# Patient Record
Sex: MALE | Race: WHITE | Employment: FULL TIME | URBAN - METROPOLITAN AREA
[De-identification: names, ages, dates, MRNs, and addresses within clinical notes are randomized per-mention and may not be internally consistent; named-entity substitution may affect disease eponyms.]

---

## 2019-08-22 ENCOUNTER — OFFICE VISIT (OUTPATIENT)
Dept: FAMILY MEDICINE | Facility: CLINIC | Age: 34
End: 2019-08-22
Payer: COMMERCIAL

## 2019-08-22 VITALS
HEIGHT: 72 IN | HEART RATE: 77 BPM | SYSTOLIC BLOOD PRESSURE: 114 MMHG | RESPIRATION RATE: 14 BRPM | BODY MASS INDEX: 28.12 KG/M2 | OXYGEN SATURATION: 98 % | DIASTOLIC BLOOD PRESSURE: 72 MMHG | WEIGHT: 207.6 LBS

## 2019-08-22 DIAGNOSIS — K21.00 GASTROESOPHAGEAL REFLUX DISEASE WITH ESOPHAGITIS: ICD-10-CM

## 2019-08-22 DIAGNOSIS — Z30.9 ENCOUNTER FOR CONTRACEPTIVE MANAGEMENT, UNSPECIFIED TYPE: ICD-10-CM

## 2019-08-22 DIAGNOSIS — Z00.00 ROUTINE GENERAL MEDICAL EXAMINATION AT A HEALTH CARE FACILITY: Primary | ICD-10-CM

## 2019-08-22 PROCEDURE — 99385 PREV VISIT NEW AGE 18-39: CPT | Performed by: FAMILY MEDICINE

## 2019-08-22 PROCEDURE — 99213 OFFICE O/P EST LOW 20 MIN: CPT | Mod: 25 | Performed by: FAMILY MEDICINE

## 2019-08-22 ASSESSMENT — MIFFLIN-ST. JEOR: SCORE: 1921.73

## 2019-08-22 ASSESSMENT — PAIN SCALES - GENERAL: PAINLEVEL: NO PAIN (0)

## 2019-08-22 NOTE — PROGRESS NOTES
"Subjective     Chad Blum is a 34 year old male who presents to clinic today for the following health issues:    HPI   Eye problem: x 1.5 weeks. Left upper eye lid. Possible sty. Patient states there is a bump and it has been red and swollen since August 11th. Patient states it is itchy and she wakes up with a goupy eye. Patient states sometimes the vision in her left eye is blurry. Patient denies any pain but says it is irritating. The inside of patient eye looks a little irritated as red.      {additonal problems for provider to add (Optional):386753}    {HIST REVIEW/ LINKS 2 (Optional):897045}    {Additional problems for the provider to add (optional):667572}  Reviewed and updated as needed this visit by Provider         Review of Systems   {ROS COMP (Optional):546835}      Objective    There were no vitals taken for this visit.  There is no height or weight on file to calculate BMI.  Physical Exam   {Exam List (Optional):252131}    {Diagnostic Test Results (Optional):712426::\"Diagnostic Test Results:\",\"Labs reviewed in Epic\"}        {PROVIDER CHARTING PREFERENCE:336481}      "

## 2019-08-22 NOTE — PROGRESS NOTES
SUBJECTIVE:   CC: Chad Blum is an 34 year old male who presents for preventive health visit.     Chief Complaint   Patient presents with     Physical     discuss need for cholesterol      Consult     discuss vasectomy     Healthy Habits:    Do you get at least three servings of calcium containing foods daily (dairy, green leafy vegetables, etc.)? yes    Amount of exercise or daily activities, outside of work: 0 day(s) per week, active lifestyle and  for work.    Problems taking medications regularly No-not currently on any medications     Medication side effects: No    Have you had an eye exam in the past two years? no    Do you see a dentist twice per year? yes    Do you have sleep apnea, excessive snoring or daytime drowsiness? no    Today's PHQ-2 Score:   PHQ-2 ( 1999 Pf[employer]) 8/22/2019   Q1: Little interest or pleasure in doing things 0   Q2: Feeling down, depressed or hopeless 0   PHQ-2 Score 0     Abuse: Current or Past(Physical, Sexual or Emotional)- No  Do you feel safe in your environment? Yes    Social History     Tobacco Use     Smoking status: Former Smoker     Smokeless tobacco: Current User     Types: Chew     Tobacco comment: occasional chew   Substance Use Topics     Alcohol use: Yes     If you drink alcohol do you typically have >3 drinks per day or >7 drinks per week? No                      Last PSA: No results found for: PSA    Reviewed orders with patient. Reviewed health maintenance and updated orders accordingly - Yes  Reviewed and updated as needed this visit by clinical staff  Reviewed and updated as needed this visit by Provider      ROS:  CONSTITUTIONAL: NEGATIVE for fever, chills, change in weight  INTEGUMENTARY/SKIN: NEGATIVE for worrisome rashes, moles or lesions  EYES: NEGATIVE for vision changes or irritation  ENT: NEGATIVE for ear, mouth and throat problems  RESP: NEGATIVE for significant cough or SOB  CV: NEGATIVE for chest pain, palpitations or peripheral  edema  GI: Hx GERD   male: negative for dysuria, hematuria, decreased urinary stream, erectile dysfunction, urethral discharge  MUSCULOSKELETAL: NEGATIVE for significant arthralgias or myalgia  NEURO: NEGATIVE for weakness, dizziness or paresthesias  PSYCHIATRIC: NEGATIVE for changes in mood or affect    SUBJECTIVE:  This 34 year old male is in for a vasectomy consultation.  He and his partner have decided they don't want to have any more children. They have decided that he will have the sterilization procedure instead of her.  Patient was given information to read prior to the consultation.      He is  and  His wife agrees. He has three healthy children at age 5, 2.5 and 4 months. No allergies to Lidocaine or iodine. No bleeding disorders. No surgery on the groin.     We talked about the risks and benefits of the vasectomy; risks being that of potential for bleeding, infection, postoperative discomfort immediately which can last for a number of weeks afterwards, also the development of spermatoceles or sperm granulomas, epididymal cysts and the possibility of failure of the procedure producing failed attempt at sterility.     Also mentioned to the patient that there is some literature out there that suggests men who have vasectomies are at increased risk for prostate cancer; however, this literature is inconclusive and controversial.  It is recommended that men who have vasectomies should have annual prostate exams through the rectum yearly after age 40 and also may benefit from a screening prostatic specific antigen test after age 40.  We also discussed signs and symptoms of prostatic enlargement or prostatic problems.     I went through the procedure with the patient, how it is done, a midline incision in the scrotum measuring approximately 1/2 inch in length.  The vas deferens is brought up through this incision, dissected free and a small portion, maybe 0.5 cm. in length is removed.  Each end is tied  "with an absorbable suture, cauterized and then the proximal or distal end is buried away from the other.  Patient had no questions when it came to the procedure itself.  I did show him pictures and diagrams of the procedure.  I showed him where a potential spermatocele or sperm granuloma can occur.      Again, the patient had no further questions for me.      OBJECTIVE:   /72   Pulse 77   Resp 14   Ht 1.816 m (5' 11.5\")   Wt 94.2 kg (207 lb 9.6 oz)   SpO2 98%   BMI 28.55 kg/m    EXAM:  Exam:  GENERAL APPEARANCE: healthy, alert and no distress  EYES: EOMI,  PERRL  HENT: ear canals and TM's normal and nose and mouth without ulcers or lesions  NECK: no adenopathy, no asymmetry, masses, or scars and thyroid normal to palpation  RESP: lungs clear to auscultation - no rales, rhonchi or wheezes  CV: regular rates and rhythm, normal S1 S2, no S3 or S4 and no murmur, click or rub -  ABDOMEN:  soft, nontender, no HSM or masses and bowel sounds normal  GU_male: testicles normal without atrophy or masses, no hernias and penis normal without urethral discharge  MS: extremities normal- no gross deformities noted, no evidence of inflammation in joints, FROM in all extremities.  SKIN: no suspicious lesions or rashes  NEURO: Normal strength and tone, sensory exam grossly normal, mentation intact and speech normal  PSYCH: mentation appears normal and affect normal/bright  LYMPHATICS: No axillary, cervical, inguinal, or supraclavicular nodes      ASSESSMENT/PLAN:   1. Routine general medical examination at a health care facility  COUNSELING:  Reviewed preventive health counseling, as reflected in patient instructions       Regular exercise       Healthy diet/nutrition       Vision screening       Hearing screening    Estimated body mass index is 28.55 kg/m  as calculated from the following:    Height as of this encounter: 1.816 m (5' 11.5\").    Weight as of this encounter: 94.2 kg (207 lb 9.6 oz).   reports that he has " quit smoking. His smokeless tobacco use includes chew.  Tobacco Cessation Action Plan: Self help information given to patient    Counseling Resources:  ATP IV Guidelines  Pooled Cohorts Equation Calculator  FRAX Risk Assessment  ICSI Preventive Guidelines  Dietary Guidelines for Americans, 2010  USDA's MyPlate  ASA Prophylaxis  Lung CA Screening      2. Encounter for contraceptive management, unspecified type  ASSESSMENT:  Undesired fertility in an adult male, requesting vasectomy.    PLAN:  He will schedule a vasectomy at his convenience for either the last appointment of the morning or on a Thursday or Friday afternoon.  He is aware that he will need to take the entire weekend off and have essentially bedrest for two days with ice packs every two hours and ibuprofen for discomfort. If he has any further questions, he will contact me prior to the procedure or ask me on the day of the procedure.  He also is aware that he will need to bring a specimen after 10-12 ejaculations to make sure that he has cleared the storage vesicles of any residual sperm and to make sure that he is sterile.   3. Gastroesophageal reflux disease with esophagitis  For the reflux, or GERD, or heartburn, use the dietary instructions and the mechanical recommendations. Avoid alcohol by itself, spicy food, carbonated beverages and caffeine.   The goal for the symptoms is to be zero. Use the OTC acid stopping meds, such as Prilosec 20 mg daily. Recheck as needed.           Hernandez Contreras MD  [facility] [place]

## 2019-08-22 NOTE — PATIENT INSTRUCTIONS
"    Preventive Health Recommendations  Male Ages 26 - 39    Yearly exam:             See your health care provider every year in order to  o   Review health changes.   o   Discuss preventive care.    o   Review your medicines if your doctor has prescribed any.    You should be tested each year for STDs (sexually transmitted diseases), if you re at risk.     After age 35, talk to your provider about cholesterol testing. If you are at risk for heart disease, have your cholesterol tested at least every 5 years.     If you are at risk for diabetes, you should have a diabetes test (fasting glucose).  Shots: Get a flu shot each year. Get a tetanus shot every 10 years.     Nutrition:    Eat at least 5 servings of fruits and vegetables daily.     Eat whole-grain bread, whole-wheat pasta and brown rice instead of white grains and rice.     Get adequate Calcium and Vitamin D.     Lifestyle    Exercise for at least 150 minutes a week (30 minutes a day, 5 days a week). This will help you control your weight and prevent disease.     Limit alcohol to one drink per day.     No smoking.     Wear sunscreen to prevent skin cancer.     See your dentist every six months for an exam and cleaning.       ASSESSMENT/PLAN:   1. Routine general medical examination at a health care facility  COUNSELING:  Reviewed preventive health counseling, as reflected in patient instructions       Regular exercise       Healthy diet/nutrition       Vision screening       Hearing screening    Estimated body mass index is 28.55 kg/m  as calculated from the following:    Height as of this encounter: 1.816 m (5' 11.5\").    Weight as of this encounter: 94.2 kg (207 lb 9.6 oz).   reports that he has quit smoking. His smokeless tobacco use includes chew.  Tobacco Cessation Action Plan: Self help information given to patient    Counseling Resources:  ATP IV Guidelines  Pooled Cohorts Equation Calculator  FRAX Risk Assessment  ICSI Preventive Guidelines  Dietary " Guidelines for Americans, 2010  USDA's MyPlate  ASA Prophylaxis  Lung CA Screening      2. Encounter for contraceptive management, unspecified type  ASSESSMENT:  Undesired fertility in an adult male, requesting vasectomy.    PLAN:  He will schedule a vasectomy at his convenience for either the last appointment of the morning or on a Thursday or Friday afternoon.  He is aware that he will need to take the entire weekend off and have essentially bedrest for two days with ice packs every two hours and ibuprofen for discomfort. If he has any further questions, he will contact me prior to the procedure or ask me on the day of the procedure.  He also is aware that he will need to bring a specimen after 10-12 ejaculations to make sure that he has cleared the storage vesicles of any residual sperm and to make sure that he is sterile.   3. Gastroesophageal reflux disease with esophagitis  For the reflux, or GERD, or heartburn, use the dietary instructions and the mechanical recommendations. Avoid alcohol by itself, spicy food, carbonated beverages and caffeine.   The goal for the symptoms is to be zero. Use the OTC acid stopping meds, such as Prilosec 20 mg daily. Recheck as needed.

## 2019-09-19 ENCOUNTER — OFFICE VISIT (OUTPATIENT)
Dept: FAMILY MEDICINE | Facility: CLINIC | Age: 34
End: 2019-09-19
Payer: COMMERCIAL

## 2019-09-19 VITALS
BODY MASS INDEX: 29.21 KG/M2 | WEIGHT: 212.4 LBS | TEMPERATURE: 97.4 F | DIASTOLIC BLOOD PRESSURE: 66 MMHG | HEART RATE: 88 BPM | SYSTOLIC BLOOD PRESSURE: 110 MMHG | RESPIRATION RATE: 16 BRPM | OXYGEN SATURATION: 97 %

## 2019-09-19 DIAGNOSIS — Z30.2 ENCOUNTER FOR STERILIZATION: Primary | ICD-10-CM

## 2019-09-19 PROCEDURE — 55250 REMOVAL OF SPERM DUCT(S): CPT | Performed by: FAMILY MEDICINE

## 2019-09-19 PROCEDURE — 88302 TISSUE EXAM BY PATHOLOGIST: CPT | Performed by: FAMILY MEDICINE

## 2019-09-19 NOTE — PROGRESS NOTES
Subjective     Chad Blum is a 34 year old male who presents to clinic today for the following health issues:    HPI   Chief Complaint   Patient presents with     Vasectomy     SUBJECTIVE:  Informed consent was obtained.  An opportunity for questions was given, these were answered to his satisfaction.  Vasectomy literature was reviewed at his consult and post vas instruction sheets have also been reviewed.  He knows that it is meant to be a permanent procedure and that he needs to bring in a post vas specimen in eight to twelve weeks after twelve to fifteen ejaculations and have a negative semen analysis with no sperm seen before he can be considered sterile.  He also knows that he has to bring in a sample in one year. He wishes to proceed.  He did shave the night before.     SEDATION:  none    PROCEDURE:  The penis was taped up on to the abdomen.  The shave was adequate. The scrotum was prepped with Betadine and draped in a sterile fashion.  Attention was turned to the right vas; this was palpated and the area infiltrated with 1% Lidocaine plain.  A 1 cm. incision was made in the skin and blunt dissection carried out through the subcutaneous tissue.  The vas was grasped with the vas clamp and brought to the surface.  The wayne vas tissue was then dissected free.  The vas was then doubly clamped and a 3 mm. section excised.  This was sent to Pathology for confirmation.  The ends were then cauterized and the proximal end buried with a pursestring suture using 4-0 Vicryl.  Any bleeders were controlled with the pursestring suture and/or cautery. The ends were inspected and hemostasis was deemed to be adequate.  The two ends were then delivered back into the scrotum and the wound was dressed.  Attention was then turned to the left vas and a similar procedure was carried out.    Specimens sent of the right and left vas.     COMPLICATIONS:  none    PLAN:  He is going to take it easy over the next couple of days.  He  will follow the instructions on his post vas instruction sheet.    MEDICATIONS:  none    NELLI RAE MD

## 2019-09-24 LAB — COPATH REPORT: NORMAL

## 2020-03-14 ENCOUNTER — APPOINTMENT (OUTPATIENT)
Dept: URGENT CARE | Facility: URGENT CARE | Age: 35
End: 2020-03-14
Payer: COMMERCIAL

## 2020-09-17 ENCOUNTER — NURSE TRIAGE (OUTPATIENT)
Dept: NURSING | Facility: CLINIC | Age: 35
End: 2020-09-17

## 2020-09-17 NOTE — TELEPHONE ENCOUNTER
His son was positive for covid-19.  He started in with shortness of breath at rest and chest tightness. He needs covid19 testing for work. He will go to the ER for evaluation.  Olivia Cleaning RN  [facility]      Reason for Disposition    MODERATE difficulty breathing (e.g., speaks in phrases, SOB even at rest, pulse 100-120)    Additional Information    Negative: SEVERE difficulty breathing (e.g., struggling for each breath, speaks in single words)    Negative: Difficult to awaken or acting confused (e.g., disoriented, slurred speech)    Negative: Bluish (or gray) lips or face now    Negative: Shock suspected (e.g., cold/pale/clammy skin, too weak to stand, low BP, rapid pulse)    Negative: Sounds like a life-threatening emergency to the triager    Negative: [1] COVID-19 exposure AND [2] no symptoms    Negative: COVID-19 and Breastfeeding, questions about    Negative: [1] Adult with possible COVID-19 symptoms AND [2] triager concerned about severity of symptoms or other causes    Negative: SEVERE or constant chest pain or pressure (Exception: mild central chest pain, present only when coughing)    Protocols used: CORONAVIRUS (COVID-19) DIAGNOSED OR [id number] 8.4.20

## 2022-03-14 ENCOUNTER — OFFICE VISIT (OUTPATIENT)
Dept: DERMATOLOGY | Facility: CLINIC | Age: 37
End: 2022-03-14
Payer: COMMERCIAL

## 2022-03-14 VITALS — OXYGEN SATURATION: 98 % | HEART RATE: 70 BPM | DIASTOLIC BLOOD PRESSURE: 72 MMHG | SYSTOLIC BLOOD PRESSURE: 124 MMHG

## 2022-03-14 DIAGNOSIS — D18.01 ANGIOMA OF SKIN: ICD-10-CM

## 2022-03-14 DIAGNOSIS — D23.9 DERMATOFIBROMA: Primary | ICD-10-CM

## 2022-03-14 DIAGNOSIS — L81.4 LENTIGO: ICD-10-CM

## 2022-03-14 PROCEDURE — 99203 OFFICE O/P NEW LOW 30 MIN: CPT | Performed by: DERMATOLOGY

## 2022-03-14 NOTE — NURSING NOTE
Chief Complaint   Patient presents with     Derm Problem     spot        Nora Marino on 3/14/2022 at 9:29 AM

## 2022-03-14 NOTE — LETTER
3/14/2022         RE: Chad Blum  [address] [id number]        Dear Colleague,    Thank you for referring your patient, Chad Blum, to the [facility]. Please see a copy of my visit note below.    Chad Blum is an extremely pleasant 36 year old year old male patient here today for itching spot on right arm.   .   Patient states this has been present for a while.  Patient reports the following symptoms:  itching.  Patient reports the following previous treatments none.  These treatments did not work.  Patient reports the following modifying factors none.  Associated symptoms: none.  Patient has no other skin complaints today.  Remainder of the HPI, Meds, PMH, Allergies, FH, and SH was reviewed in chart.    No past medical history on file.    No past surgical history on file.     Family History   Problem Relation Age of Onset     Hyperlipidemia Father      Lung Cancer Paternal Grandmother      Hyperlipidemia Paternal Grandfather        Social History     Socioeconomic History     Marital status:      Spouse name: Not on file     Number of children: Not on file     Years of education: Not on file     Highest education level: Not on file   Occupational History     Not on file   Tobacco Use     Smoking status: Former Smoker     Smokeless tobacco: Current User     Types: Chew     Tobacco comment: occasional chew   Substance and Sexual Activity     Alcohol use: Yes     Drug use: No     Sexual activity: Yes     Partners: Female   Other Topics Concern     Not on file   Social History Narrative     Not on file     Social Determinants of Health     Financial Resource Strain: Not on file   Food Insecurity: Not on file   Transportation Needs: Not on file   Physical Activity: Not on file   Stress: Not on file   Social Connections: Not on file   Intimate Partner Violence: Not on file   Housing Stability: Not on file       Outpatient Encounter Medications as of  3/14/2022   Medication Sig Dispense Refill     NO ACTIVE MEDICATIONS        No facility-administered encounter medications on file as of 3/14/2022.             O:   NAD, WDWN, Alert & Oriented, Mood & Affect wnl, Vitals stable   Here today with wife    /72 (BP Location: Right arm, Patient Position: Sitting, Cuff Size: Adult Regular)   Pulse 70   SpO2 98%    General appearance normal   Vitals stable   Alert, oriented and in no acute distress     R arm firm papule with button hole sign  Brown macules and red papules on arms       Eyes: Conjunctivae/lids:Normal     ENT: Lips, buccal mucosa, tongue: normal    MSK:Normal    Cardiovascular: peripheral edema none    Pulm: Breathing Normal    Neuro/Psych: Orientation:Alert and Orientedx3 ; Mood/Affect:normal       A/P:  1. Dermatofibroma  Excision and shave ex discussed with patient   He will schedle prn   Scar discussed with patient   2. Lentigo, angioma  It was a pleasure speaking to Chad Blum today.  Previous clinic notes and pertinent laboratory tests were reviewed prior to Chad Blum's visit.  Nature and genetics of benign skin lesions dicussed with patient.  Patient encouraged to perform monthly skin exams.  UV precautions reviewed with patient.  Return to clinic next appt        Again, thank you for allowing me to participate in the care of your patient.        Sincerely,        Finesse Hatfield MD

## 2022-03-14 NOTE — PATIENT INSTRUCTIONS
What is a dermatofibroma?  A dermatofibroma is a common benign fibrous nodule usually found on the skin of the lower legs.    A dermatofibroma is also called a cutaneous fibrous histiocytoma.    Who gets a dermatofibroma?  Dermatofibromas are mostly seen in adults. People of every ethnicity can develop dermatofibromas. Ordinary dermatofibromas are more common in women than in men, although some histologic variants are more commonly identified in males.    What causes dermatofibroma?  It is not clear if dermatofibroma is a reactive process or a true neoplasm. The lesions are composed of proliferating fibroblasts. Histiocytes may also be involved.    They are sometimes attributed to minor trauma including insect bites, injections, or a lupe thorn injury, but not consistently. Multiple dermatofibromas can develop in patients with altered immunity such as HIV, immunosuppression, or autoimmune conditions.      Report ad  What are the clinical features of dermatofibroma?  A dermatofibroma usually presents as a solitary firm papule or nodule on a limb.    A dermatofibroma can occur anywhere on the skin.  Dermatofibroma size varies from 0.5-1.5 cm diameter; most lesions are 7-10 mm diameter.  A dermatofibroma is tethered to the skin surface and mobile over subcutaneous tissue.  The overlying skin dimples on pinching the lesion - the dimple or pinch sign.  Colour may be pink to light brown in white skin, and dark brown to black in dark skin; some appear paler in the centre.  Dermatofibromas do not usually cause symptoms, but they are sometimes painful, tender, or itchy.  Clinical variants include giant, eruptive, and multiple forms.    Dermatofibroma  Dermatofibroma  Dermatofibroma    Dermatofibroma  Dermatofibroma    Dermatofibroma  Dermatofibroma    Dermatofibroma  Dermatofibroma    Dermatofibroma pinch sign  Pinch sign of dermatofibroma    Dermatofibroma  Dermoscopy of dermatofibroma    See more images of  dermatofibroma.    What are the complications of dermatofibroma?  Because dermatofibromas are often raised lesions, they may be traumatised, for example by a razor.    Occasionally dozens may erupt within a few months, usually in the setting of immunosuppression.    Dermatofibroma does not give rise to cancer. However, occasionally, it may be mistaken for dermatofibrosarcoma protuberans or desmoplastic melanoma.    How is a dermatofibroma diagnosed?  Dermatofibroma is usually easy to diagnose clinically, supported by dermoscopy. The most common dermoscopic pattern is a central white area surrounded by a faint pigment network. However different patterns may be seen in skin of colour.    Diagnostic excision or skin biopsy is undertaken if there is an atypical feature such as recent enlargement, ulceration, or asymmetrical structures and colours on dermoscopy.    The pathology of dermatofibroma shows whirling fascicles of spindle cell proliferation with excessive collagen deposition in the dermis. There are many pathological variants including:    cellular  aneurysmal  epithelioid  atypical  lipidized ankle-type  palisading  cholesterotic  In case of doubt, immunohistochemical staining is used to confirm the diagnosis.      Report ad  What is the treatment for dermatofibroma?  A dermatofibroma is harmless and seldom causes any symptoms. Usually, only reassurance is needed. If it is nuisance or causing concern, the lesion can be removed surgically. Recurrence is common as the lesion often extends beyond the clinical margin.    Cryotherapy, shave biopsy and laser treatments are rarely completely successful.

## 2022-03-14 NOTE — PROGRESS NOTES
Chad Blum is an extremely pleasant 36 year old year old male patient here today for itching spot on right arm.   .   Patient states this has been present for a while.  Patient reports the following symptoms:  itching.  Patient reports the following previous treatments none.  These treatments did not work.  Patient reports the following modifying factors none.  Associated symptoms: none.  Patient has no other skin complaints today.  Remainder of the HPI, Meds, PMH, Allergies, FH, and SH was reviewed in chart.    No past medical history on file.    No past surgical history on file.     Family History   Problem Relation Age of Onset     Hyperlipidemia Father      Lung Cancer Paternal Grandmother      Hyperlipidemia Paternal Grandfather        Social History     Socioeconomic History     Marital status:      Spouse name: Not on file     Number of children: Not on file     Years of education: Not on file     Highest education level: Not on file   Occupational History     Not on file   Tobacco Use     Smoking status: Former Smoker     Smokeless tobacco: Current User     Types: Chew     Tobacco comment: occasional chew   Substance and Sexual Activity     Alcohol use: Yes     Drug use: No     Sexual activity: Yes     Partners: Female   Other Topics Concern     Not on file   Social History Narrative     Not on file     Social Determinants of Health     Financial Resource Strain: Not on file   Food Insecurity: Not on file   Transportation Needs: Not on file   Physical Activity: Not on file   Stress: Not on file   Social Connections: Not on file   Intimate Partner Violence: Not on file   Housing Stability: Not on file       Outpatient Encounter Medications as of 3/14/2022   Medication Sig Dispense Refill     NO ACTIVE MEDICATIONS        No facility-administered encounter medications on file as of 3/14/2022.             O:   NAD, WDWN, Alert & Oriented, Mood & Affect wnl, Vitals stable   Here today with wife    BP  124/72 (BP Location: Right arm, Patient Position: Sitting, Cuff Size: Adult Regular)   Pulse 70   SpO2 98%    General appearance normal   Vitals stable   Alert, oriented and in no acute distress     R arm firm papule with button hole sign  Brown macules and red papules on arms       Eyes: Conjunctivae/lids:Normal     ENT: Lips, buccal mucosa, tongue: normal    MSK:Normal    Cardiovascular: peripheral edema none    Pulm: Breathing Normal    Neuro/Psych: Orientation:Alert and Orientedx3 ; Mood/Affect:normal       A/P:  1. Dermatofibroma  Excision and shave ex discussed with patient   He will schedle prn   Scar discussed with patient   2. Lentigo, angioma  It was a pleasure speaking to Chad Blum today.  Previous clinic notes and pertinent laboratory tests were reviewed prior to Chad Blum's visit.  Nature and genetics of benign skin lesions dicussed with patient.  Patient encouraged to perform monthly skin exams.  UV precautions reviewed with patient.  Return to clinic next appt

## 2024-04-13 ENCOUNTER — HOSPITAL ENCOUNTER (EMERGENCY)
Facility: CLINIC | Age: 39
Discharge: HOME | End: 2024-04-13
Attending: EMERGENCY MEDICINE | Admitting: EMERGENCY MEDICINE
Payer: COMMERCIAL

## 2024-04-13 VITALS
BODY MASS INDEX: 24.52 KG/M2 | OXYGEN SATURATION: 98 % | DIASTOLIC BLOOD PRESSURE: 80 MMHG | TEMPERATURE: 97 F | HEIGHT: 73 IN | SYSTOLIC BLOOD PRESSURE: 136 MMHG | RESPIRATION RATE: 16 BRPM | HEART RATE: 74 BPM | WEIGHT: 185 LBS
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
## 2024-04-13 DIAGNOSIS — T18.128A ESOPHAGEAL OBSTRUCTION DUE TO FOOD IMPACTION: ICD-10-CM

## 2024-04-13 PROCEDURE — 99284 EMERGENCY DEPT VISIT MOD MDM: CPT | Performed by: EMERGENCY MEDICINE

## 2024-04-13 PROCEDURE — 250N000013 HC RX MED GY IP 250 OP 250 PS 637: Performed by: EMERGENCY MEDICINE

## 2024-04-13 PROCEDURE — 99283 EMERGENCY DEPT VISIT LOW MDM: CPT | Performed by: EMERGENCY MEDICINE

## 2024-04-13 RX ADMIN — ANTACID/ANTIFLATULENT 4 G: 380; 550; 10; 10 GRANULE, EFFERVESCENT ORAL at 21:50

## 2024-04-13 ASSESSMENT — COLUMBIA-SUICIDE SEVERITY RATING SCALE - C-SSRS
2. HAVE YOU ACTUALLY HAD ANY THOUGHTS OF KILLING YOURSELF IN THE PAST MONTH?: NO
1. IN THE PAST MONTH, HAVE YOU WISHED YOU WERE DEAD OR WISHED YOU COULD GO TO SLEEP AND NOT WAKE UP?: NO

## 2024-04-13 ASSESSMENT — ACTIVITIES OF DAILY LIVING (ADL)
ADLS_ACUITY_SCORE: 33
ADLS_ACUITY_SCORE: 35

## 2024-04-14 NOTE — ED PROVIDER NOTES
"  History     Chief Complaint   Patient presents with    Airway Obstruction     HPI  Chad Blum is a 38 year old male who presents for throat pain.  The patient says that he was seen in [facility] shortly prior to his arrival here when he felt to get stuck in his throat.  Since that time he has not been able to swallow anything, has been spitting up his secretions.  Pain in the throat.  No difficulty breathing.  No nausea.    Allergies:  Allergies   Allergen Reactions    Penicillins        Problem List:    Patient Active Problem List    Diagnosis Date Noted    Gastroesophageal reflux disease with esophagitis 08/22/2019     Priority: Medium        Past Medical History:    No past medical history on file.    Past Surgical History:    No past surgical history on file.    Family History:    Family History   Problem Relation Age of Onset    Hyperlipidemia Father     Lung Cancer Paternal Grandmother     Hyperlipidemia Paternal Grandfather        Social History:  Marital Status:   [4]  Social History     Tobacco Use    Smoking status: Former    Smokeless tobacco: Current     Types: Chew    Tobacco comments:     occasional chew   Substance Use Topics    Alcohol use: Yes    Drug use: No        Medications:    NO ACTIVE MEDICATIONS          Review of Systems    Physical Exam   BP: 136/80  Pulse: 74  Temp: 97  F (36.1  C)  Resp: 16  Height: 185.4 cm (6' 1\")  Weight: 83.9 kg (185 lb)  SpO2: 100 %      Physical Exam  Constitutional:       General: He is not in acute distress.     Appearance: He is well-developed. He is not diaphoretic.   HENT:      Head: Normocephalic and atraumatic.      Nose: No congestion.      Mouth/Throat:      Mouth: Mucous membranes are moist.   Eyes:      General: No scleral icterus.     Conjunctiva/sclera: Conjunctivae normal.   Cardiovascular:      Rate and Rhythm: Normal rate.   Pulmonary:      Effort: Pulmonary effort is normal.   Musculoskeletal:      Cervical back: Normal range of motion " and neck supple.   Skin:     General: Skin is warm and dry.      Capillary Refill: Capillary refill takes less than 2 seconds.      Findings: No rash.   Neurological:      Mental Status: He is alert and oriented to person, place, and time.         ED Course        Procedures              Critical Care time:  none               No results found for this or any previous visit (from the past 24 hour(s)).    Medications   sod bicarbonate-citric acid-simethicone (EZ GAS) 2.21-1.53-0.04 g packet 4 g (4 g Oral $Given 4/13/24 7464)       Assessments & Plan (with Medical Decision Making)   38-year-old male who presents for throat pain after eating a burrito, feels as if something is stuck in his throat, cannot swallow.  He is given EZ gas and after short period of time the patient is feeling much better.  He is now tolerating oral intake without difficulty.  He feels comfortable going home and is discharged with instructions to return if he has worsening of his symptoms or other concerns otherwise follow-up in clinic.    I have reviewed the nursing notes.    I have reviewed the findings, diagnosis, plan and need for follow up with the patient.         New Prescriptions    No medications on file       Final diagnoses:   Esophageal obstruction due to food impaction       4/13/2024   [facility] EMERGENCY DEPT       Riccardo Quintana MD  04/13/24 [id number]

## 2025-03-20 ENCOUNTER — TRANSCRIBE ORDERS (OUTPATIENT)
Dept: OTHER | Age: 40
End: 2025-03-20

## 2025-03-20 ENCOUNTER — TELEPHONE (OUTPATIENT)
Dept: OTOLARYNGOLOGY | Facility: CLINIC | Age: 40
End: 2025-03-20
Payer: COMMERCIAL

## 2025-03-20 DIAGNOSIS — K13.79 UVULAR SWELLING: Primary | ICD-10-CM

## 2025-03-20 NOTE — TELEPHONE ENCOUNTER
[facility] Call Center    Phone Message    May a detailed message be left on voicemail: yes     Reason for Call: Appointment Intake    Referring Provider Name: internal intake   Diagnosis and/or Symptoms:  K13.79 (ICD-10-CM) - Uvular swelling. PER [facility], send TE. Please review referral and reach out to pt to schedule. He would prefer to go to [place] location. Did confirm phone number and insurance. Thanks     Action Taken: Message routed to:  [facility] ENT    Travel Screening: Not Applicable     Date of Service:

## 2025-03-24 NOTE — TELEPHONE ENCOUNTER
Writer called and spoke with pt regarding ENT referral and scheduling appointment with Dr. Griffin. Pt is scheduled for 3/28/2025 9:00 am with Dr. Griffin in [place]. Pt verbalized understanding and is agreeable to date/time/location/reason for appointment.         Duyen Goldberg RN on 3/24/2025 at 12:11 PM

## 2025-05-07 ENCOUNTER — TELEPHONE (OUTPATIENT)
Dept: CARDIOLOGY | Facility: CLINIC | Age: 40
End: 2025-05-07
Payer: COMMERCIAL

## 2025-05-07 NOTE — TELEPHONE ENCOUNTER
Left Voicemail (1st Attempt) for the patient to call back and schedule the following:    Appointment type: New Gen Card  Provider: Any  Return date: ASAP  Specialty phone number: [phone]  Additional appointment(s) needed:   Additonal Notes: Chest pain per pt

## 2025-05-12 ENCOUNTER — OFFICE VISIT (OUTPATIENT)
Dept: CARDIOLOGY | Facility: CLINIC | Age: 40
End: 2025-05-12
Attending: INTERNAL MEDICINE
Payer: COMMERCIAL

## 2025-05-12 VITALS
HEART RATE: 63 BPM | WEIGHT: 187.6 LBS | OXYGEN SATURATION: 98 % | SYSTOLIC BLOOD PRESSURE: 123 MMHG | DIASTOLIC BLOOD PRESSURE: 74 MMHG | BODY MASS INDEX: 24.75 KG/M2

## 2025-05-12 DIAGNOSIS — R00.2 PALPITATIONS: ICD-10-CM

## 2025-05-12 DIAGNOSIS — R07.9 CHEST PAIN, UNSPECIFIED TYPE: Primary | ICD-10-CM

## 2025-05-12 LAB
ATRIAL RATE - MUSE: 65 BPM
DIASTOLIC BLOOD PRESSURE - MUSE: NORMAL MMHG
INTERPRETATION ECG - MUSE: NORMAL
P AXIS - MUSE: 44 DEGREES
PR INTERVAL - MUSE: 122 MS
QRS DURATION - MUSE: 100 MS
QT - MUSE: 408 MS
QTC - MUSE: 424 MS
R AXIS - MUSE: 30 DEGREES
SYSTOLIC BLOOD PRESSURE - MUSE: NORMAL MMHG
T AXIS - MUSE: 34 DEGREES
VENTRICULAR RATE- MUSE: 65 BPM

## 2025-05-12 PROCEDURE — 99213 OFFICE O/P EST LOW 20 MIN: CPT | Performed by: INTERNAL MEDICINE

## 2025-05-12 PROCEDURE — 93005 ELECTROCARDIOGRAM TRACING: CPT

## 2025-05-12 ASSESSMENT — PAIN SCALES - GENERAL: PAINLEVEL_OUTOF10: MILD PAIN (3)

## 2025-05-12 NOTE — NURSING NOTE
Chief Complaint   Patient presents with    New Patient     5/12/25--Dr. Valdes: Patient is self referred for cardiac assessment related to history of chest pain and fatigue.         Vitals were taken, medications reconciled     Wilner Hdez, Clinic Assistant     1:58 PM

## 2025-05-12 NOTE — PROGRESS NOTES
HPI: 40 yo WM with hx of remote tobacco use, remote polysubstance use (but no recent use within past five yrs) and social alcohol use who presents with CP and palpitations.  Reports unknown stressors with pressure sensation and time period (minutes to hours) without nausea, diaphoresis, presyncope, syncope, weight loss.  Does report palpitations occurring infrequently.      PAST MEDICAL HISTORY:  No past medical history on file.    FAMILY HISTORY:  Family History   Problem Relation Age of Onset    Hyperlipidemia Father     Lung Cancer Paternal Grandmother     Hyperlipidemia Paternal Grandfather        SOCIAL HISTORY:  Social History     Socioeconomic History    Marital status:      Spouse name: None    Number of children: None    Years of education: None    Highest education level: None   Tobacco Use    Smoking status: Former    Smokeless tobacco: Current     Types: Chew    Tobacco comments:     occasional chew   Substance and Sexual Activity    Alcohol use: Yes    Drug use: No    Sexual activity: Yes     Partners: Female       CURRENT MEDICATIONS:  Current Outpatient Medications   Medication Sig Dispense Refill    omeprazole (PRILOSEC OTC) 20 MG EC tablet Take 20 mg by mouth daily.      omeprazole (PRILOSEC) 40 MG DR capsule Take 1 capsule (40 mg) by mouth daily. 90 capsule 3       ROS:   Constitutional: No fever, chills, or sweats. No weight gain/loss.   ENT: No visual disturbance, ear ache, epistaxis, sore throat.   Allergies/Immunologic: Negative.   Respiratory: No cough, hemoptysis.   Cardiovascular: As per HPI.   GI: No nausea, vomiting, hematemesis, melena, or hematochezia.   : No urinary frequency, dysuria, or hematuria.   Integument: Negative.   Psychiatric: Negative.   Neuro: Negative.   Endocrinology: Negative.   Musculoskeletal: Negative.    EXAM:  /74 (BP Location: Right arm, Patient Position: Chair, Cuff Size: Adult Regular)   Pulse 63   Wt 85.1 kg (187 lb 9.6 oz)   SpO2 98%   BMI  "24.75 kg/m    General: appears comfortable, alert and articulate  Head: normocephalic, atraumatic  Eyes: anicteric sclera, EOMI  Neck: no adenopathy  Orophyarynx: moist mucosa, no lesions, dentition intact  Heart: regular, S1/S2, no murmur, gallop, rub, estimated JVP 7  Lungs: clear, no rales or wheezing  Abdomen: soft, non-tender, bowel sounds present, no hepatosplenomegaly  Extremities: no clubbing, cyanosis or edema  Neurological: normal speech and affect, no gross motor deficits    Labs:  CBC RESULTS:  No results found for: \"WBC\", \"RBC\", \"HGB\", \"HCT\", \"MCV\", \"MCH\", \"MCHC\", \"RDW\", \"PLT\"    CMP RESULTS:  No results found for: \"NA\", \"POTASSIUM\", \"CHLORIDE\", \"CO2\", \"ANIONGAP\", \"GLC\", \"BUN\", \"CR\", \"GFRESTIMATED\", \"GFRESTBLACK\", \"ERIKA\", \"BILITOTAL\", \"ALBUMIN\", \"ALKPHOS\", \"ALT\", \"AST\"     INR RESULTS:  No results found for: \"INR\"    No results found for: \"MAG\"  No results found for: \"NTBNPI\"  No results found for: \"NTBNP\"    Assessment and Plan:   Pt with atypical CP and palpitations.  EKG today reveals no ischemic changes and no arrhythmias.  Plan to obtain 7d ziopatch, TTE and exercise stress study.  Pt will F/U with general cardiology or [facility].      Skinny Valdes MD, PhD  Professor, Heart Failure and Cardiac Transplantation  [facility]     CC  Patient Care Team:  No Ref-Primary, Physician as PCP - Skinny Hernandez MD as Assigned Surgical Provider  Skinny Valdes MD as MD (Cardiovascular Disease)  SELF, REFERRED      "

## 2025-05-12 NOTE — LETTER
5/12/2025      RE: Chad Blum  [address] [address]       Dear Colleague,    Thank you for the opportunity to participate in the care of your patient, Chad Blum, at the [facility] [place] at [facility]. Please see a copy of my visit note below.    HPI: 40 yo WM with hx of remote tobacco use, remote polysubstance use (but no recent use within past five yrs) and social alcohol use who presents with CP and palpitations.  Reports unknown stressors with pressure sensation and time period (minutes to hours) without nausea, diaphoresis, presyncope, syncope, weight loss.  Does report palpitations occurring infrequently.      PAST MEDICAL HISTORY:  No past medical history on file.    FAMILY HISTORY:  Family History   Problem Relation Age of Onset     Hyperlipidemia Father      Lung Cancer Paternal Grandmother      Hyperlipidemia Paternal Grandfather        SOCIAL HISTORY:  Social History     Socioeconomic History     Marital status:      Spouse name: None     Number of children: None     Years of education: None     Highest education level: None   Tobacco Use     Smoking status: Former     Smokeless tobacco: Current     Types: Chew     Tobacco comments:     occasional chew   Substance and Sexual Activity     Alcohol use: Yes     Drug use: No     Sexual activity: Yes     Partners: Female       CURRENT MEDICATIONS:  Current Outpatient Medications   Medication Sig Dispense Refill     omeprazole (PRILOSEC OTC) 20 MG EC tablet Take 20 mg by mouth daily.       omeprazole (PRILOSEC) 40 MG DR capsule Take 1 capsule (40 mg) by mouth daily. 90 capsule 3       ROS:   Constitutional: No fever, chills, or sweats. No weight gain/loss.   ENT: No visual disturbance, ear ache, epistaxis, sore throat.   Allergies/Immunologic: Negative.   Respiratory: No cough, hemoptysis.   Cardiovascular: As per HPI.   GI: No nausea, vomiting, hematemesis,  "melena, or hematochezia.   : No urinary frequency, dysuria, or hematuria.   Integument: Negative.   Psychiatric: Negative.   Neuro: Negative.   Endocrinology: Negative.   Musculoskeletal: Negative.    EXAM:  /74 (BP Location: Right arm, Patient Position: Chair, Cuff Size: Adult Regular)   Pulse 63   Wt 85.1 kg (187 lb 9.6 oz)   SpO2 98%   BMI 24.75 kg/m    General: appears comfortable, alert and articulate  Head: normocephalic, atraumatic  Eyes: anicteric sclera, EOMI  Neck: no adenopathy  Orophyarynx: moist mucosa, no lesions, dentition intact  Heart: regular, S1/S2, no murmur, gallop, rub, estimated JVP 7  Lungs: clear, no rales or wheezing  Abdomen: soft, non-tender, bowel sounds present, no hepatosplenomegaly  Extremities: no clubbing, cyanosis or edema  Neurological: normal speech and affect, no gross motor deficits    Labs:  CBC RESULTS:  No results found for: \"WBC\", \"RBC\", \"HGB\", \"HCT\", \"MCV\", \"MCH\", \"MCHC\", \"RDW\", \"PLT\"    CMP RESULTS:  No results found for: \"NA\", \"POTASSIUM\", \"CHLORIDE\", \"CO2\", \"ANIONGAP\", \"GLC\", \"BUN\", \"CR\", \"GFRESTIMATED\", \"GFRESTBLACK\", \"ERIKA\", \"BILITOTAL\", \"ALBUMIN\", \"ALKPHOS\", \"ALT\", \"AST\"     INR RESULTS:  No results found for: \"INR\"    No results found for: \"MAG\"  No results found for: \"NTBNPI\"  No results found for: \"NTBNP\"    Assessment and Plan:   Pt with atypical CP and palpitations.  EKG today reveals no ischemic changes and no arrhythmias.  Plan to obtain 7d ziopatch, TTE and exercise stress study.  Pt will F/U with general cardiology or [facility].      Skinny Valdes MD, PhD  Professor, Heart Failure and Cardiac Transplantation  [facility]     CC  Patient Care Team:  No Ref-Primary, Physician as PCP - General  Skinny Griffin MD as Assigned Surgical Provider  Skinny Valdes MD as MD (Cardiovascular Disease)  SELF, REFERRED      Please do not hesitate to contact me if you have any questions/concerns.     Sincerely,     Skinny Valdes MD  "

## 2025-05-12 NOTE — PATIENT INSTRUCTIONS
Dr. Valdes recommends:    One week Zio Patch heart monitor. Hx of palpitations and chest  pain.    Echocardiogram for the near future.    Exercise stress test for the near future.    Follow up with general SHARON in one month, after testing complete and zio patch results available.    Thank you for your visit today.  Please call me with any questions or concerns.   Bobby Pickering RN  Cardiology Care Coordinator  [phone]